# Patient Record
Sex: MALE | Race: BLACK OR AFRICAN AMERICAN | Employment: OTHER | URBAN - NONMETROPOLITAN AREA
[De-identification: names, ages, dates, MRNs, and addresses within clinical notes are randomized per-mention and may not be internally consistent; named-entity substitution may affect disease eponyms.]

---

## 2020-07-09 RX ORDER — GABAPENTIN 400 MG/1
400 CAPSULE ORAL 3 TIMES DAILY
COMMUNITY

## 2020-07-09 RX ORDER — TAMSULOSIN HYDROCHLORIDE 0.4 MG/1
0.4 CAPSULE ORAL DAILY
COMMUNITY

## 2020-07-09 RX ORDER — AMLODIPINE BESYLATE 5 MG/1
5 TABLET ORAL DAILY
COMMUNITY

## 2020-07-09 RX ORDER — MEMANTINE HYDROCHLORIDE 10 MG/1
TABLET ORAL DAILY
COMMUNITY

## 2020-07-09 RX ORDER — ATORVASTATIN CALCIUM 20 MG/1
TABLET, FILM COATED ORAL DAILY
COMMUNITY

## 2020-07-09 RX ORDER — LISINOPRIL 10 MG/1
TABLET ORAL DAILY
COMMUNITY

## 2020-07-09 RX ORDER — INSULIN GLARGINE 100 [IU]/ML
INJECTION, SOLUTION SUBCUTANEOUS
COMMUNITY

## 2020-07-09 RX ORDER — GLIPIZIDE 10 MG/1
5 TABLET, FILM COATED, EXTENDED RELEASE ORAL DAILY
COMMUNITY

## 2020-07-09 RX ORDER — NAPROXEN 500 MG/1
500 TABLET ORAL 2 TIMES DAILY WITH MEALS
COMMUNITY

## 2020-07-21 ENCOUNTER — OFFICE VISIT (OUTPATIENT)
Dept: ORTHOPEDIC SURGERY | Age: 70
End: 2020-07-21
Payer: MEDICARE

## 2020-07-21 VITALS — BODY MASS INDEX: 33.59 KG/M2 | HEIGHT: 72 IN | WEIGHT: 248 LBS

## 2020-07-21 DIAGNOSIS — M54.2 CERVICALGIA: Primary | ICD-10-CM

## 2020-07-21 PROCEDURE — 99203 OFFICE O/P NEW LOW 30 MIN: CPT | Performed by: ORTHOPAEDIC SURGERY

## 2020-07-21 RX ORDER — METHYLPREDNISOLONE 4 MG/1
TABLET ORAL
Qty: 1 DOSE PACK | Refills: 0 | Status: SHIPPED | OUTPATIENT
Start: 2020-07-21

## 2020-07-21 RX ORDER — PEN NEEDLE, DIABETIC 31 GX5/16"
NEEDLE, DISPOSABLE MISCELLANEOUS
COMMUNITY
Start: 2020-06-16

## 2020-07-21 NOTE — PROGRESS NOTES
1. Have you been to the ER, urgent care clinic since your last visit? Hospitalized since your last visit? No    2. Have you seen or consulted any other health care providers outside of the [address] [place] [address] since your last visit? Include any pap smears or colon screening.  No

## 2020-07-21 NOTE — PROGRESS NOTES
Name: Angel Corona    : 1950  Service Dept: [address] [place] [address]         Chief Complaint   Patient presents with    Shoulder Pain    Back Pain    Neck Pain        Patient's Pharmacies:    [address] [place] [address] [facility], [facility]  [address] [facility] [address] [id number]  Phone: [phone] Fax: [phone]       Visit Vitals  Ht 6' (1.829 m)   Wt 248 lb (112.5 kg)   BMI 33.63 kg/m²        Allergies   Allergen Reactions    Shellfish Derived Unknown (comments)        Current Outpatient Medications   Medication Sig Dispense Refill    OneTouch Ultra Blue Test Strip strip USE TO CHECK GLUCOSE ONCE DAILY      BD Ultra-Fine Mini Pen Needle 31 gauge x 3/16\" ndle USE 1 ONCE DAILY      methylPREDNISolone (MEDROL DOSEPACK) 4 mg tablet Per dose pack instructions 1 Dose Pack 0    amLODIPine (NORVASC) 5 mg tablet Take 5 mg by mouth daily. Filled 2020      atorvastatin (LIPITOR) 20 mg tablet Take  by mouth daily. Filled 2020      gabapentin (NEURONTIN) 400 mg capsule Take 400 mg by mouth three (3) times daily. Filled 2020      glipiZIDE SR (GLUCOTROL XL) 10 mg CR tablet Take 5 mg by mouth daily. Filled 2020      insulin glargine (Lantus Solostar U-100 Insulin) 100 unit/mL (3 mL) inpn by SubCUTAneous route. Fill 2020      lisinopriL (PRINIVIL, ZESTRIL) 10 mg tablet Take  by mouth daily. Fill 2020      memantine (NAMENDA) 10 mg tablet Take  by mouth daily. Fill 2020      naproxen (NAPROSYN) 500 mg tablet Take 500 mg by mouth two (2) times daily (with meals). Fill 2020      tamsulosin (FLOMAX) 0.4 mg capsule Take 0.4 mg by mouth daily. 2020          There is no problem list on file for this patient.        Family History   Family history unknown: Yes        Social History     Socioeconomic History    Marital status: UNKNOWN     Spouse name: Not on file    Number of children: Not on file    Years of education: Not on file    Highest education level: Not on file   Tobacco Use    Smoking status: Never Smoker    Smokeless tobacco: Never Used   Substance and Sexual Activity    Alcohol use: Yes     Alcohol/week: 1.0 standard drinks     Types: 1 Cans of beer per week        History reviewed. No pertinent surgical history. Past Medical History:   Diagnosis Date    Asthma     Diabetes ([facility] [address]     Hypertension         HPI:   I have reviewed and agree with PFSH and ROS and intake form in chart and the record. Review of Systems:  Patient is a pleasant appearing individual, appropriately dressed, well hydrated, well nourished, who is alert, appropriately oriented for age, and in no acute distress with a normal gait and normal affect who does not appear to be in any significant pain. Physical Exam:  patient's neck and shoulders are grossly neurovascularly intact. The patient's neck has decreased motion in all planes both passively and actively. Good strength against resistance with lateralized pin-point tenderness to palpation radiating to the shoulder. Positive Spurlings test. Positive forward flexion test. No swelling or instablity. No skin lesions noted. HPI  The patient is here with a chief complaint of bilateral shoulder pain and more neck and back pain, stabbing, burning pain. It has been the same. Nothing has helped. Pain is 8/10. ROS  Positive for joint swelling, instability and weakness. Assessment/Plan  1. Spine and cervicalgia. Plan at this point, plan would be for a spine specialist and Medrol DosePak and we will go from there. Return to Office:    Follow-up Information    None

## 2020-07-23 ENCOUNTER — DOCUMENTATION ONLY (OUTPATIENT)
Dept: ORTHOPEDIC SURGERY | Age: 70
End: 2020-07-23

## 2021-05-25 ENCOUNTER — TRANSCRIBE ORDER (OUTPATIENT)
Dept: SCHEDULING | Age: 71
End: 2021-05-25

## 2021-05-25 DIAGNOSIS — R06.02 SHORTNESS OF BREATH: ICD-10-CM

## 2021-05-25 DIAGNOSIS — R07.9 CHEST PAIN: Primary | ICD-10-CM

## 2021-06-02 ENCOUNTER — HOSPITAL ENCOUNTER (OUTPATIENT)
Dept: NUCLEAR MEDICINE | Age: 71
Discharge: HOME | End: 2021-06-02
Attending: INTERNAL MEDICINE
Payer: MEDICARE

## 2021-06-02 ENCOUNTER — HOSPITAL ENCOUNTER (OUTPATIENT)
Dept: NON INVASIVE DIAGNOSTICS | Age: 71
Discharge: HOME | End: 2021-06-02
Attending: INTERNAL MEDICINE
Payer: MEDICARE

## 2021-06-02 VITALS
HEIGHT: 72 IN | WEIGHT: 248 LBS | BODY MASS INDEX: 33.59 KG/M2 | DIASTOLIC BLOOD PRESSURE: 82 MMHG | SYSTOLIC BLOOD PRESSURE: 143 MMHG

## 2021-06-02 DIAGNOSIS — R07.9 CHEST PAIN: ICD-10-CM

## 2021-06-02 DIAGNOSIS — R06.02 SHORTNESS OF BREATH: ICD-10-CM

## 2021-06-02 LAB
ECHO AO ROOT DIAM: 3.3 CM
ECHO AV PEAK GRADIENT: 7 MMHG
ECHO EST RA PRESSURE: 3 MMHG
ECHO LA AREA 4C: 16.75 CM2
ECHO LA MAJOR AXIS: 2.4 CM
ECHO LV E' SEPTAL VELOCITY: 6.34 CM/S
ECHO LV EDV A2C: 72 CM3
ECHO LV EJECTION FRACTION BIPLANE: 66.4 % (ref 55–100)
ECHO LV ESV A2C: 18.6 CM3
ECHO LV INTERNAL DIMENSION DIASTOLIC: 4.16 CM (ref 4.2–5.9)
ECHO LV INTERNAL DIMENSION SYSTOLIC: 2.65 CM
ECHO LV IVSD: 1.35 CM (ref 0.6–1)
ECHO LV MASS 2D: 156.8 G (ref 88–224)
ECHO LV POSTERIOR WALL DIASTOLIC: 0.87 CM (ref 0.6–1)
ECHO LVOT PEAK GRADIENT: 4 MMHG
ECHO MV A VELOCITY: 119 CM/S
ECHO MV AREA PHT: 2.47 CM2
ECHO MV E DECELERATION TIME (DT): 275 MS
ECHO MV E VELOCITY: 97.5 CM/S
ECHO MV E/A RATIO: 0.82
ECHO MV E/E' SEPTAL: 15.38
ECHO MV PRESSURE HALF TIME (PHT): 89 MS
ECHO PV PEAK INSTANTANEOUS GRADIENT SYSTOLIC: 3 MMHG
ECHO PV REGURGITANT MAX VELOCITY: 133 CM/S
ECHO PV REGURGITANT MAX VELOCITY: 161 CM/S
ECHO PV REGURGITANT MAX VELOCITY: 264 CM/S
ECHO PV REGURGITANT MAX VELOCITY: 89 CM/S
ECHO PV REGURGITANT MAX VELOCITY: 96.9 CM/S
ECHO PVEIN A DURATION: 74 MS
ECHO PVEIN A VELOCITY: 24.7 CM/S
ECHO RIGHT VENTRICULAR SYSTOLIC PRESSURE (RVSP): 31 MMHG
ECHO RV INTERNAL DIMENSION: 2.94 CM
ECHO TV MEAN GRADIENT: 28 MMHG
MV DEC SLOPE: 3410 MM/S2
MV DEC SLOPE: 3410 MM/S2
STRESS BASELINE DIAS BP: 82 MMHG
STRESS BASELINE HR: 60 BPM
STRESS BASELINE SYS BP: 143 MMHG
STRESS PERCENT HR ACHIEVED: 85 %
STRESS POST PEAK HR: 127 BPM
STRESS ST DEPRESSION: 0 MM
STRESS ST ELEVATION: 0 MM
STRESS STAGE 1 DURATION: NORMAL MIN:SEC
STRESS STAGE 1 HR: 62 BPM
STRESS STAGE 2 BP: NORMAL MMHG
STRESS STAGE 2 DURATION: NORMAL MIN:SEC
STRESS STAGE 2 HR: 96 BPM
STRESS STAGE 3 BP: NORMAL MMHG
STRESS STAGE 3 DURATION: NORMAL MIN:SEC
STRESS STAGE 3 HR: 89 BPM
STRESS STAGE 4 BP: NORMAL MMHG
STRESS STAGE 4 DURATION: NORMAL MIN:SEC
STRESS STAGE 4 HR: 75 BPM
STRESS TARGET HR: 149 BPM

## 2021-06-02 PROCEDURE — 93017 CV STRESS TEST TRACING ONLY: CPT

## 2021-06-02 PROCEDURE — 93306 TTE W/DOPPLER COMPLETE: CPT

## 2021-06-02 PROCEDURE — 74011250636 HC RX REV CODE- 250/636

## 2021-06-02 RX ORDER — AMINOPHYLLINE 25 MG/ML
INJECTION, SOLUTION INTRAVENOUS
Status: DISCONTINUED
Start: 2021-06-02 | End: 2021-06-02 | Stop reason: WASHOUT

## 2021-06-02 RX ADMIN — REGADENOSON 0.4 MG: 0.08 INJECTION, SOLUTION INTRAVENOUS at 10:22

## 2022-04-19 ENCOUNTER — TRANSCRIBE ORDER (OUTPATIENT)
Dept: SCHEDULING | Age: 72
End: 2022-04-19

## 2022-04-19 DIAGNOSIS — M47.812 CERVICAL SPONDYLOSIS WITHOUT MYELOPATHY: Primary | ICD-10-CM

## 2022-04-19 DIAGNOSIS — M50.30 DDD (DEGENERATIVE DISC DISEASE), CERVICAL: ICD-10-CM

## 2022-05-05 ENCOUNTER — HOSPITAL ENCOUNTER (OUTPATIENT)
Dept: MRI IMAGING | Age: 72
Discharge: HOME | End: 2022-05-05
Payer: MEDICARE

## 2022-05-05 DIAGNOSIS — M47.812 CERVICAL SPONDYLOSIS WITHOUT MYELOPATHY: ICD-10-CM

## 2022-05-05 DIAGNOSIS — M50.30 DDD (DEGENERATIVE DISC DISEASE), CERVICAL: ICD-10-CM

## 2022-05-05 PROCEDURE — 72141 MRI NECK SPINE W/O DYE: CPT

## 2023-01-12 ENCOUNTER — TRANSCRIBE ORDER (OUTPATIENT)
Dept: SCHEDULING | Age: 73
End: 2023-01-12

## 2023-01-12 DIAGNOSIS — R07.9 CHEST PAIN: ICD-10-CM

## 2023-01-12 DIAGNOSIS — I87.2 PERIPHERAL VENOUS INSUFFICIENCY: Primary | ICD-10-CM

## 2023-01-12 DIAGNOSIS — R06.02 SHORTNESS OF BREATH: ICD-10-CM

## 2023-01-17 ENCOUNTER — HOSPITAL ENCOUNTER (OUTPATIENT)
Dept: NUCLEAR MEDICINE | Age: 73
End: 2023-01-17
Attending: INTERNAL MEDICINE
Payer: MEDICARE

## 2023-01-17 ENCOUNTER — HOSPITAL ENCOUNTER (OUTPATIENT)
Dept: NON INVASIVE DIAGNOSTICS | Age: 73
End: 2023-01-17
Attending: INTERNAL MEDICINE
Payer: MEDICARE

## 2023-01-17 ENCOUNTER — HOSPITAL ENCOUNTER (OUTPATIENT)
Dept: NUCLEAR MEDICINE | Age: 73
Discharge: HOME | End: 2023-01-17
Attending: INTERNAL MEDICINE
Payer: MEDICARE

## 2023-01-17 VITALS
DIASTOLIC BLOOD PRESSURE: 79 MMHG | HEIGHT: 72 IN | BODY MASS INDEX: 33.59 KG/M2 | SYSTOLIC BLOOD PRESSURE: 127 MMHG | WEIGHT: 248 LBS

## 2023-01-17 DIAGNOSIS — R07.9 CHEST PAIN: ICD-10-CM

## 2023-01-17 DIAGNOSIS — R06.02 SHORTNESS OF BREATH: ICD-10-CM

## 2023-01-17 LAB
NUC STRESS EJECTION FRACTION: 67 %
STRESS BASELINE DIAS BP: 79 MMHG
STRESS BASELINE HR: 62 BPM
STRESS BASELINE ST DEPRESSION: 0 MM
STRESS BASELINE SYS BP: 127 MMHG
STRESS ST DEPRESSION: 0 MM
STRESS STAGE 1 BP: NORMAL MMHG
STRESS STAGE 1 DURATION: 1 MIN:SEC
STRESS STAGE 1 HR: 94 BPM
STRESS STAGE 2 DURATION: 2 MIN:SEC
STRESS STAGE 2 HR: 85 BPM
STRESS STAGE 3 BP: NORMAL MMHG
STRESS STAGE 3 DURATION: 3 MIN:SEC
STRESS STAGE 3 HR: 74 BPM
STRESS STAGE 4 DURATION: 4 MIN:SEC
STRESS STAGE 4 HR: 71 BPM
STRESS STAGE 5 BP: NORMAL MMHG
STRESS STAGE 5 DURATION: 5 MIN:SEC
STRESS STAGE 5 HR: 68 BPM
STRESS TARGET HR: 148 BPM

## 2023-01-17 PROCEDURE — 78452 HT MUSCLE IMAGE SPECT MULT: CPT

## 2023-01-17 PROCEDURE — 74011250636 HC RX REV CODE- 250/636

## 2023-01-17 RX ADMIN — REGADENOSON 0.4 MG: 0.08 INJECTION, SOLUTION INTRAVENOUS at 09:10
